# Patient Record
Sex: FEMALE
[De-identification: names, ages, dates, MRNs, and addresses within clinical notes are randomized per-mention and may not be internally consistent; named-entity substitution may affect disease eponyms.]

---

## 2017-08-05 ENCOUNTER — CHARTING TRANS (OUTPATIENT)
Dept: OTHER | Age: 52
End: 2017-08-05

## 2018-11-03 VITALS
RESPIRATION RATE: 14 BRPM | HEIGHT: 61 IN | SYSTOLIC BLOOD PRESSURE: 108 MMHG | WEIGHT: 128 LBS | HEART RATE: 77 BPM | TEMPERATURE: 99.1 F | DIASTOLIC BLOOD PRESSURE: 58 MMHG | OXYGEN SATURATION: 100 % | BODY MASS INDEX: 24.17 KG/M2

## 2020-11-03 ENCOUNTER — WALK IN (OUTPATIENT)
Dept: URGENT CARE | Age: 55
End: 2020-11-03

## 2020-11-03 VITALS
TEMPERATURE: 97.5 F | DIASTOLIC BLOOD PRESSURE: 80 MMHG | RESPIRATION RATE: 20 BRPM | HEART RATE: 76 BPM | SYSTOLIC BLOOD PRESSURE: 124 MMHG

## 2020-11-03 DIAGNOSIS — H66.90 CHRONIC OTITIS MEDIA, UNSPECIFIED OTITIS MEDIA TYPE: Primary | ICD-10-CM

## 2020-11-03 PROCEDURE — 99203 OFFICE O/P NEW LOW 30 MIN: CPT | Performed by: NURSE PRACTITIONER

## 2020-11-03 RX ORDER — AMOXICILLIN 875 MG/1
875 TABLET, COATED ORAL 2 TIMES DAILY
Qty: 20 TABLET | Refills: 0 | Status: SHIPPED | OUTPATIENT
Start: 2020-11-03 | End: 2020-11-13

## 2020-11-03 ASSESSMENT — ENCOUNTER SYMPTOMS
HEADACHES: 0
RHINORRHEA: 1
COUGH: 0
DIZZINESS: 0
CHILLS: 0
FEVER: 0
SORE THROAT: 0

## 2023-03-19 ENCOUNTER — APPOINTMENT (OUTPATIENT)
Dept: GENERAL RADIOLOGY | Age: 58
End: 2023-03-19
Attending: EMERGENCY MEDICINE
Payer: OTHER MISCELLANEOUS

## 2023-03-19 ENCOUNTER — APPOINTMENT (OUTPATIENT)
Dept: GENERAL RADIOLOGY | Age: 58
End: 2023-03-19

## 2023-03-19 ENCOUNTER — HOSPITAL ENCOUNTER (EMERGENCY)
Age: 58
Discharge: HOME | End: 2023-03-19
Attending: EMERGENCY MEDICINE

## 2023-03-19 VITALS
HEIGHT: 61 IN | WEIGHT: 175 LBS | RESPIRATION RATE: 14 BRPM | DIASTOLIC BLOOD PRESSURE: 82 MMHG | SYSTOLIC BLOOD PRESSURE: 118 MMHG | HEART RATE: 75 BPM | BODY MASS INDEX: 33.04 KG/M2 | OXYGEN SATURATION: 97 % | TEMPERATURE: 98 F

## 2023-03-19 DIAGNOSIS — S80.02XA CONTUSION OF LEFT KNEE, INITIAL ENCOUNTER: ICD-10-CM

## 2023-03-19 DIAGNOSIS — S93.402A SPRAIN OF LEFT ANKLE, UNSPECIFIED LIGAMENT, INITIAL ENCOUNTER: ICD-10-CM

## 2023-03-19 DIAGNOSIS — S20.212A CONTUSION OF RIB ON LEFT SIDE, INITIAL ENCOUNTER: Primary | ICD-10-CM

## 2023-03-19 PROCEDURE — 73562 X-RAY EXAM OF KNEE 3: CPT

## 2023-03-19 PROCEDURE — 99284 EMERGENCY DEPT VISIT MOD MDM: CPT

## 2023-03-19 PROCEDURE — 73562 X-RAY EXAM OF KNEE 3: CPT | Performed by: EMERGENCY MEDICINE

## 2023-03-19 PROCEDURE — 71101 X-RAY EXAM UNILAT RIBS/CHEST: CPT | Performed by: EMERGENCY MEDICINE

## 2023-03-19 PROCEDURE — 71101 X-RAY EXAM UNILAT RIBS/CHEST: CPT

## 2023-03-19 PROCEDURE — 73610 X-RAY EXAM OF ANKLE: CPT | Performed by: EMERGENCY MEDICINE

## 2023-03-19 RX ORDER — HYDROCODONE BITARTRATE AND ACETAMINOPHEN 5; 325 MG/1; MG/1
1 TABLET ORAL EVERY 4 HOURS PRN
Qty: 10 TABLET | Refills: 0 | Status: SHIPPED | OUTPATIENT
Start: 2023-03-19 | End: 2023-03-21

## 2023-03-19 RX ORDER — HYDROCODONE BITARTRATE AND ACETAMINOPHEN 5; 325 MG/1; MG/1
1 TABLET ORAL ONCE
Status: COMPLETED | OUTPATIENT
Start: 2023-03-19 | End: 2023-03-19

## 2023-03-19 NOTE — ED INITIAL ASSESSMENT (HPI)
C/o fall s/p spasm of the back   Recently discontinued the muscle relaxer and antiinflamatory   C/o left knee and left rib pain

## 2023-03-20 NOTE — DISCHARGE INSTRUCTIONS
Tylenol or Advil for milder pain take the Norco for more severe pain. Juan Best may make you drowsy, nauseated, or constipated. Do not drink alcohol or drive or operate heavy machinery while taking this. Follow-up with orthopedic doctor in 1 week if not better return if worse.

## 2024-06-11 ENCOUNTER — APPOINTMENT (OUTPATIENT)
Dept: GENERAL RADIOLOGY | Age: 59
End: 2024-06-11
Payer: COMMERCIAL

## 2024-06-11 ENCOUNTER — HOSPITAL ENCOUNTER (EMERGENCY)
Age: 59
Discharge: HOME | End: 2024-06-11
Attending: EMERGENCY MEDICINE
Payer: COMMERCIAL

## 2024-06-11 VITALS
TEMPERATURE: 97 F | WEIGHT: 180 LBS | SYSTOLIC BLOOD PRESSURE: 119 MMHG | HEIGHT: 61 IN | DIASTOLIC BLOOD PRESSURE: 72 MMHG | RESPIRATION RATE: 16 BRPM | BODY MASS INDEX: 33.99 KG/M2 | HEART RATE: 73 BPM | OXYGEN SATURATION: 99 %

## 2024-06-11 DIAGNOSIS — S22.31XA CLOSED FRACTURE OF ONE RIB OF RIGHT SIDE, INITIAL ENCOUNTER: Primary | ICD-10-CM

## 2024-06-11 PROCEDURE — 99284 EMERGENCY DEPT VISIT MOD MDM: CPT

## 2024-06-11 PROCEDURE — 99283 EMERGENCY DEPT VISIT LOW MDM: CPT

## 2024-06-11 PROCEDURE — 71101 X-RAY EXAM UNILAT RIBS/CHEST: CPT | Performed by: EMERGENCY MEDICINE

## 2024-06-11 RX ORDER — HYDROCODONE BITARTRATE AND ACETAMINOPHEN 5; 325 MG/1; MG/1
1 TABLET ORAL EVERY 4 HOURS PRN
Qty: 15 TABLET | Refills: 0 | Status: SHIPPED | OUTPATIENT
Start: 2024-06-11 | End: 2024-06-18

## 2024-06-11 RX ORDER — DICLOFENAC SODIUM 75 MG/1
1 TABLET, DELAYED RELEASE ORAL 2 TIMES DAILY
COMMUNITY
Start: 2023-02-22

## 2024-06-11 RX ORDER — NAPROXEN 500 MG/1
1 TABLET ORAL 2 TIMES DAILY
COMMUNITY
Start: 2021-11-29

## 2024-06-11 RX ORDER — TRAZODONE HYDROCHLORIDE 50 MG/1
25 TABLET ORAL NIGHTLY
COMMUNITY
Start: 2024-04-24

## 2024-06-11 RX ORDER — MELOXICAM 15 MG/1
1 TABLET ORAL DAILY
COMMUNITY
Start: 2024-04-24

## 2024-06-11 NOTE — ED INITIAL ASSESSMENT (HPI)
Right rib cage pain. Pt states she had a back spasm Saturday while gardening at home which caused her to fall.

## 2024-06-11 NOTE — ED PROVIDER NOTES
Patient Seen in: [facility] In [place]      History     Chief Complaint   Patient presents with    Trauma     Stated Complaint: right rib cage injury s/p fall    Subjective:   HPI    58-year-old female tripped and fell about 3 days ago was complaining of right rib pain that worsens when she takes deep breath no vomiting or fever she tried over-the-counter medicine with minimal improvement.    Objective:   Past Medical History:    Anemia    Back pain              Past Surgical History:   Procedure Laterality Date          Cholecystectomy      Gastric bypass,obesity,sb reconstruc                  No pertinent social history.            Review of Systems    Positive for stated complaint: right rib cage injury s/p fall  Other systems are as noted in HPI.  Constitutional and vital signs reviewed.      All other systems reviewed and negative except as noted above.    Physical Exam     ED Triage Vitals [24 1202]   /72   Pulse 73   Resp 16   Temp 97 °F (36.1 °C)   Temp src Temporal   SpO2 99 %   O2 Device None (Room air)       Current Vitals:   Vital Signs  BP: 119/72  Pulse: 73  Resp: 16  Temp: 97 °F (36.1 °C)  Temp src: Temporal    Oxygen Therapy  SpO2: 99 %  O2 Device: None (Room air)            Physical Exam    Patient is alert orient x 3 no acute distress the head is atraumatic the neck is nontender lungs are clear cardiovascular exam shows regular rate and rhythm without murmurs abdomen soft nontender chest on ribs there is moderate tenderness over the lower right anterolateral rib just below the breast.  No crepitus or deformity neurologic exam is normal skin is no franc    ED Course   Labs Reviewed - No data to display        XR RIBS WITH CHEST (3 VIEWS), RIGHT  (CPT=71101)    Result Date: 2024  CONCLUSION:  8th rib fracture.   LOCATION:  [facility]     Dictated by (CST): Rudy Guzman MD on 2024 at 12:41 PM     Finalized by (CST): Rudy Guzman MD on 2024 at 12:43  PM      Images independently reviewed there is eighth rib fracture  .sr         MDM      Initial differential diagnoses considered but not limited to includes rib fracture rib contusion  Patient has rib fractures given incentive spirometry Norco for pain advised follow-up doctor few days return any problems                                 Medical Decision Making      Disposition and Plan     Clinical Impression:  1. Closed fracture of one rib of right side, initial encounter         Disposition:  Discharge  6/11/2024 12:58 pm    Follow-up:  No follow-up provider specified.        Medications Prescribed:  Current Discharge Medication List        START taking these medications    Details   HYDROcodone-acetaminophen 5-325 MG Oral Tab Take 1 tablet by mouth every 4 (four) hours as needed for Pain.  Qty: 15 tablet, Refills: 0    Associated Diagnoses: Closed fracture of one rib of right side, initial encounter

## 2024-06-11 NOTE — DISCHARGE INSTRUCTIONS
Tylenol or Advil as needed use the hydrocodone for more severe pain.  This may make you drowsy nauseated sometimes constipation.

## (undated) NOTE — LETTER
June 11, 2024    Patient: Ivana Ward   Date of Visit: 6/11/2024       To Whom It May Concern:    Ivana Ward was seen and treated in our emergency department on 6/11/2024. She should not return to work until June 17 .  June 17 May return sooner if better if you have any questions or concerns, please don't hesitate to call.       Encounter Provider(s):    Charanjit Birmingham MD